# Patient Record
Sex: FEMALE | Race: WHITE | URBAN - METROPOLITAN AREA
[De-identification: names, ages, dates, MRNs, and addresses within clinical notes are randomized per-mention and may not be internally consistent; named-entity substitution may affect disease eponyms.]

---

## 2019-05-06 ENCOUNTER — TELEPHONE (OUTPATIENT)
Dept: GASTROENTEROLOGY | Facility: CLINIC | Age: 74
End: 2019-05-06

## 2019-05-06 DIAGNOSIS — I99.8 ANGIECTASIA: Primary | ICD-10-CM

## 2019-05-06 NOTE — TELEPHONE ENCOUNTER
[facility] Intake form:    Referring/Requesting provider and Health care System: Dr. Guzman Herrera, [facility]    Clinic contact - Name, Phone and Fax number: Tori Ph: [phone], fax: [phone]    Requested provider (if specified): No    Has patient been evaluated in clinic or had a procedure [facility] provider in the last 5 years: Yes     Indication/Diagnosis for consultation: GI Bleed    Is diagnosis on list of approved diagnosis: Yes      Has patient been evaluated by another Gastroenterologist? Yes, if yes, who?  Dr. Stahl/Dr. Driscoll    Imaging completed:     CT scan     No   MRI         No      Procedures:     Upper Endoscopy/EGD   No     Endoscopic Ultrasound/EUS No    ERCP      No    Colonoscopy    Yes     Are images able/being pushed to our system?  Yes   Is patient aware of request for clinc consultation and ok to be contacted to schedule? Yes, patient is being discharged to a rehab facility- so please contact Tori at [phone]    Inform referring clinic of the following and provided fax number to: [facility] - attn: *your name - [phone]       READ TO REFERRING CLINIC:  Due to high demands for our services our clinic requires all records including imaging studies be mailed and faxed to our facility prior to scheduling. Records should be faxed within 24-72 hours of referral if possible and images should be pushed to our PACS system or received by mail within 72 hours of referral. Our office will NOT call to follow up or request records.  Our clinic will not be able to process, schedule or contact patient without records and Images for MD review process. Once records have been reviewed and recommendation/orders have been made the patient will be contacted to schedule. If records have not been received within 2 weeks of initial referral call, referring office will be notified by letter and referral will be closed. If an appointment is unable to be  offered at this time we will inform the referring office and patient via letter.

## 2019-05-07 NOTE — TELEPHONE ENCOUNTER
Per Dr. Driscoll    Repeat capsule study with images or the video itself sent here if positive with plan for enteroscopy only after review     Called referral contact and left a message to discuss plan for obtaining new capsule study    Marianne Raymundo RN Care Coordinator

## 2019-05-08 ENCOUNTER — TELEPHONE (OUTPATIENT)
Dept: GASTROENTEROLOGY | Facility: CLINIC | Age: 74
End: 2019-05-08

## 2019-05-08 NOTE — TELEPHONE ENCOUNTER
Reviewed plan with Tori from referring physician. Advised that we would pursue a new capsule study.  Tori says that the best contact son or daughter in law    [phone] Home (lives with son, Tad, and wife    [phone]- unsure who's phone this is.    Orders placed for capsule study in endo. Called Tad to advise of test. Email sent to Endo organize testing    Marianne Raymundo RN Care Coordinator

## 2019-05-09 ENCOUNTER — TELEPHONE (OUTPATIENT)
Dept: GASTROENTEROLOGY | Facility: CLINIC | Age: 74
End: 2019-05-09

## 2019-05-10 ENCOUNTER — TELEPHONE (OUTPATIENT)
Dept: GASTROENTEROLOGY | Facility: CLINIC | Age: 74
End: 2019-05-10

## 2019-05-17 ENCOUNTER — PATIENT OUTREACH (OUTPATIENT)
Dept: GASTROENTEROLOGY | Facility: CLINIC | Age: 74
End: 2019-05-17

## 2019-05-17 NOTE — PROGRESS NOTES
Patient returned call to clarify plan of care as she's been inpatient for a bit.  Advised that Dr. Driscoll is recommending a capsule study to determine the need for enteroscopy intervention, provided endo scheduling number as they had tried to contact several times.    Patient voiced understanding of plan.    Marianne Raymundo RN Care Coordinator

## 2019-05-29 ENCOUNTER — TELEPHONE (OUTPATIENT)
Dept: GASTROENTEROLOGY | Facility: CLINIC | Age: 74
End: 2019-05-29

## 2019-05-29 NOTE — TELEPHONE ENCOUNTER
Order Questions     Question Answer Comment   Procedure Other (Specify in Comments) capsule study   Purpose of Procedure Diagnostic Angiectasia [I99.8]  - Primary            Does the patient have the following? Congestive heart or cardiovascular problems (cardiomyopathy)    Is the patient on the following medications? ASA 81 mgPatient      Preferred Location [facility] [phone]    Referring MD Dr. Guzman Driscoll      ? no    Arrival time verified? June 5th @ 10 am    Facility location verified? [address];1st [address]    Instructions given regarding prep and procedure    Prep Type Miralax, magnesium citrate; Dulcolax    Are you taking any anticoagulants or blood thinners? ASA 81 mg    Instructions given? Yes,    Electronic implanted devices? no    Pre procedure teaching completed? Yes

## 2019-06-05 ENCOUNTER — HOSPITAL ENCOUNTER (OUTPATIENT)
Facility: CLINIC | Age: 74
Discharge: HOME | End: 2019-06-05
Attending: INTERNAL MEDICINE | Admitting: INTERNAL MEDICINE
Payer: MEDICARE

## 2019-06-05 PROCEDURE — 91110 GI TRC IMG INTRAL ESOPH-ILE: CPT | Performed by: INTERNAL MEDICINE

## 2019-06-05 NOTE — OR NURSING
Confirmed prep taken as directed.Patient has had procedure twice before so knows the steps of the procedure. Risks gone over and patient consented to procedure.Patient swallowed capsule without difficulty and connected to the reciever properly.The screen on the  was checked and the stomach was observed.  Discharge instructions gone over with patient and family. Daughter will return tomorrow am.

## 2019-06-08 LAB — VIDEO CAPSULE ENDOSCOPY: NORMAL

## 2019-06-17 DIAGNOSIS — I99.8 ANGIECTASIA: Primary | ICD-10-CM

## 2019-06-25 ENCOUNTER — TRANSFERRED RECORDS (OUTPATIENT)
Dept: HEALTH INFORMATION MANAGEMENT | Facility: CLINIC | Age: 74
End: 2019-06-25

## 2019-07-01 ENCOUNTER — PATIENT OUTREACH (OUTPATIENT)
Dept: GASTROENTEROLOGY | Facility: CLINIC | Age: 74
End: 2019-07-01

## 2019-07-01 NOTE — PROGRESS NOTES
Reviewed results of capsule study with patient. Results also sent via [employer] [facility].    Patient will f/up with PCP    Marianne Raymundo, [facility] Care Coordinator

## 2020-03-10 ENCOUNTER — HEALTH MAINTENANCE LETTER (OUTPATIENT)
Age: 75
End: 2020-03-10

## 2020-12-27 ENCOUNTER — HEALTH MAINTENANCE LETTER (OUTPATIENT)
Age: 75
End: 2020-12-27

## 2021-04-24 ENCOUNTER — HEALTH MAINTENANCE LETTER (OUTPATIENT)
Age: 76
End: 2021-04-24

## 2021-10-04 ENCOUNTER — HEALTH MAINTENANCE LETTER (OUTPATIENT)
Age: 76
End: 2021-10-04

## 2022-05-15 ENCOUNTER — HEALTH MAINTENANCE LETTER (OUTPATIENT)
Age: 77
End: 2022-05-15

## 2022-09-11 ENCOUNTER — HEALTH MAINTENANCE LETTER (OUTPATIENT)
Age: 77
End: 2022-09-11

## 2023-06-03 ENCOUNTER — HEALTH MAINTENANCE LETTER (OUTPATIENT)
Age: 78
End: 2023-06-03